# Patient Record
Sex: MALE | Race: BLACK OR AFRICAN AMERICAN | NOT HISPANIC OR LATINO | URBAN - METROPOLITAN AREA
[De-identification: names, ages, dates, MRNs, and addresses within clinical notes are randomized per-mention and may not be internally consistent; named-entity substitution may affect disease eponyms.]

---

## 2022-05-18 ENCOUNTER — HOSPITAL ENCOUNTER (EMERGENCY)
Facility: CLINIC | Age: 22
Discharge: LEFT BEFORE DISCHARGE | End: 2022-05-18
Payer: COMMERCIAL

## 2022-05-18 VITALS
OXYGEN SATURATION: 99 % | TEMPERATURE: 98.6 F | WEIGHT: 161 LBS | DIASTOLIC BLOOD PRESSURE: 73 MMHG | SYSTOLIC BLOOD PRESSURE: 113 MMHG | HEART RATE: 64 BPM | RESPIRATION RATE: 18 BRPM

## 2022-05-18 NOTE — ED TRIAGE NOTES
Patient presents after playing basketball yesterday and having someone step on his foot. Patient has been having pain and swelling since yesterday with worsening pain today. Patient reports 7/10 pain with movement.      Triage Assessment     Row Name 05/18/22 1052       Triage Assessment (Adult)    Airway WDL WDL       Respiratory WDL    Respiratory WDL WDL       Skin Circulation/Temperature WDL    Skin Circulation/Temperature WDL WDL       Cardiac WDL    Cardiac WDL WDL       Peripheral/Neurovascular WDL    Peripheral Neurovascular WDL WDL       Cognitive/Neuro/Behavioral WDL    Cognitive/Neuro/Behavioral WDL WDL

## 2022-10-06 ENCOUNTER — HOSPITAL ENCOUNTER (EMERGENCY)
Facility: CLINIC | Age: 22
Discharge: HOME | End: 2022-10-06
Payer: COMMERCIAL

## 2024-07-26 ENCOUNTER — HOSPITAL ENCOUNTER (EMERGENCY)
Facility: CLINIC | Age: 24
Discharge: HOME | End: 2024-07-26
Admitting: EMERGENCY MEDICINE

## 2024-07-26 VITALS
SYSTOLIC BLOOD PRESSURE: 118 MMHG | TEMPERATURE: 98.1 F | WEIGHT: 159 LBS | DIASTOLIC BLOOD PRESSURE: 82 MMHG | RESPIRATION RATE: 16 BRPM | HEART RATE: 57 BPM | OXYGEN SATURATION: 100 %

## 2024-07-26 PROCEDURE — 99281 EMR DPT VST MAYX REQ PHY/QHP: CPT

## 2024-07-26 ASSESSMENT — COLUMBIA-SUICIDE SEVERITY RATING SCALE - C-SSRS
1. IN THE PAST MONTH, HAVE YOU WISHED YOU WERE DEAD OR WISHED YOU COULD GO TO SLEEP AND NOT WAKE UP?: NO
2. HAVE YOU ACTUALLY HAD ANY THOUGHTS OF KILLING YOURSELF IN THE PAST MONTH?: NO
6. HAVE YOU EVER DONE ANYTHING, STARTED TO DO ANYTHING, OR PREPARED TO DO ANYTHING TO END YOUR LIFE?: NO

## 2024-07-26 ASSESSMENT — ACTIVITIES OF DAILY LIVING (ADL): ADLS_ACUITY_SCORE: 33

## 2024-07-26 NOTE — ED NOTES
Patient requesting to leave, wants to go pray. Offered a space for patient to pray but declined stating that he wants to go to the [employer]. Signed declination form

## 2024-07-26 NOTE — ED TRIAGE NOTES
Pt states he noticed his lips when doing a duck face are asymmetrical and is concerned. Pt denies any other issues or concerns. Pt denies chest pain and numbness or tingling. Pt is alert and orientated x3.     Triage Assessment (Adult)       Row Name 07/26/24 1245          Triage Assessment    Airway WDL WDL        Respiratory WDL    Respiratory WDL WDL        Skin Circulation/Temperature WDL    Skin Circulation/Temperature WDL WDL        Cardiac WDL    Cardiac WDL WDL        Peripheral/Neurovascular WDL    Peripheral Neurovascular WDL WDL        Cognitive/Neuro/Behavioral WDL    Cognitive/Neuro/Behavioral WDL WDL